# Patient Record
(demographics unavailable — no encounter records)

---

## 2024-11-11 NOTE — HISTORY OF PRESENT ILLNESS
[FreeTextEntry1] : Mood follow up [de-identified] : Currently on fluoxetine 10mg daily. Going to therapy once a week. In general feels ok but sometimes something will trigger her. Currently going through divorce which is stressful, will have to move and also switch her son's school. Started yoga and pilates Out of work for 6 months due to mood and just restarted. Denies SI/HI.

## 2024-11-11 NOTE — ASSESSMENT
[FreeTextEntry1] : 46 y/o F seen for follow up for anxiety/depression Currently having weekly therapy. Tolerating SSRI. - Increase fluoxetine to 20mg PO daily. - Continue therapy  RTC for scheduled CPE or sooner prn.

## 2025-03-13 NOTE — ASSESSMENT
[FreeTextEntry1] : 45 y/o healthy F here for CPE. Doing well. No concerns  Incidental 2.3cm liver lesion on lumbar MRI - Will refer for abdominal u/s (normal U/S in 2016) - check LFTs  HCM: - Mammo 3/2024 --> due now - Check labs as ordered - Pap/HPV negative 8/24 - GI referral for colonoscopy - Tdap 2014  RTC in 1 year or sooner prn.

## 2025-03-13 NOTE — HEALTH RISK ASSESSMENT
[Patient reported mammogram was normal] : Patient reported mammogram was normal [Patient reported PAP Smear was normal] : Patient reported PAP Smear was normal [MammogramDate] : 03/24 [PapSmearDate] : 10/24 [PapSmearComments] : +BV [HIVDate] : 03/24 [HepatitisCDate] : 03/24
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
